# Patient Record
Sex: FEMALE | Race: BLACK OR AFRICAN AMERICAN
[De-identification: names, ages, dates, MRNs, and addresses within clinical notes are randomized per-mention and may not be internally consistent; named-entity substitution may affect disease eponyms.]

---

## 2019-03-22 ENCOUNTER — HOSPITAL ENCOUNTER (OUTPATIENT)
Dept: HOSPITAL 8 - LDOP | Age: 24
Discharge: HOME | End: 2019-03-22
Attending: OBSTETRICS & GYNECOLOGY
Payer: MEDICAID

## 2019-03-22 DIAGNOSIS — O26.893: Primary | ICD-10-CM

## 2019-03-22 DIAGNOSIS — Z3A.39: ICD-10-CM

## 2019-03-22 PROCEDURE — G0463 HOSPITAL OUTPT CLINIC VISIT: HCPCS

## 2019-03-22 PROCEDURE — 99211 OFF/OP EST MAY X REQ PHY/QHP: CPT

## 2019-03-22 PROCEDURE — 59025 FETAL NON-STRESS TEST: CPT

## 2019-04-14 ENCOUNTER — HOSPITAL ENCOUNTER (EMERGENCY)
Dept: HOSPITAL 8 - ED | Age: 24
Discharge: HOME | End: 2019-04-14
Payer: MEDICAID

## 2019-04-14 VITALS — SYSTOLIC BLOOD PRESSURE: 137 MMHG | DIASTOLIC BLOOD PRESSURE: 89 MMHG

## 2019-04-14 VITALS — WEIGHT: 203.05 LBS | BODY MASS INDEX: 37.36 KG/M2 | HEIGHT: 62 IN

## 2019-04-14 DIAGNOSIS — K80.70: Primary | ICD-10-CM

## 2019-04-14 PROCEDURE — 99283 EMERGENCY DEPT VISIT LOW MDM: CPT

## 2019-04-14 NOTE — NUR
PT TRANSFERRED FROM [facility] FOR RUQ PAIN. PT HAS IMPACTED GALL STONE. MD 
AT BEDSIDE